# Patient Record
Sex: MALE | HISPANIC OR LATINO | Employment: PART TIME | URBAN - METROPOLITAN AREA
[De-identification: names, ages, dates, MRNs, and addresses within clinical notes are randomized per-mention and may not be internally consistent; named-entity substitution may affect disease eponyms.]

---

## 2018-08-10 ENCOUNTER — NON-PROVIDER VISIT (OUTPATIENT)
Dept: OCCUPATIONAL MEDICINE | Facility: CLINIC | Age: 38
End: 2018-08-10

## 2018-08-10 DIAGNOSIS — Z11.1 ENCOUNTER FOR PPD TEST: ICD-10-CM

## 2018-08-10 PROCEDURE — 86580 TB INTRADERMAL TEST: CPT | Performed by: PREVENTIVE MEDICINE

## 2018-08-12 ENCOUNTER — NON-PROVIDER VISIT (OUTPATIENT)
Dept: URGENT CARE | Facility: CLINIC | Age: 38
End: 2018-08-12

## 2018-08-12 LAB — TB WHEAL 3D P 5 TU DIAM: 0 MM

## 2020-08-22 ENCOUNTER — NON-PROVIDER VISIT (OUTPATIENT)
Dept: URGENT CARE | Facility: CLINIC | Age: 40
End: 2020-08-22

## 2020-08-22 DIAGNOSIS — Z11.1 PPD SCREENING TEST: ICD-10-CM

## 2020-08-22 PROCEDURE — 86580 TB INTRADERMAL TEST: CPT | Performed by: PHYSICIAN ASSISTANT

## 2020-08-22 NOTE — PROGRESS NOTES
Tenzin Saldivar is a 40 y.o. male here for a non-provider visit for PPD placement -- Step 1 of 1    Reason for PPD:  group home requirement    1. TB evaluation questionnaire completed by patient? Yes      -  If any answers marked yes did you contact a provider prior to placing? Not Indicated  2.  Patient notified to return to clinic for reading on: 08/24/20 -08/25/20 @ 11:  3.  PPD Placement documentation completed on TB evaluation questionnaire? Yes  4.  Location of TB evaluation questionnaire filed: [facility]

## 2020-08-24 ENCOUNTER — NON-PROVIDER VISIT (OUTPATIENT)
Dept: OCCUPATIONAL MEDICINE | Facility: CLINIC | Age: 40
End: 2020-08-24

## 2020-08-24 LAB — TB WHEAL 3D P 5 TU DIAM: NORMAL MM

## 2022-11-28 ENCOUNTER — NON-PROVIDER VISIT (OUTPATIENT)
Dept: OCCUPATIONAL MEDICINE | Facility: CLINIC | Age: 42
End: 2022-11-28

## 2022-11-28 DIAGNOSIS — Z11.1 ENCOUNTER FOR PPD TEST: Primary | ICD-10-CM

## 2022-11-28 PROCEDURE — 86580 TB INTRADERMAL TEST: CPT | Performed by: NURSE PRACTITIONER

## 2022-11-30 ENCOUNTER — NON-PROVIDER VISIT (OUTPATIENT)
Dept: OCCUPATIONAL MEDICINE | Facility: CLINIC | Age: 42
End: 2022-11-30

## 2022-11-30 LAB — TB WHEAL 3D P 5 TU DIAM: NORMAL MM
